# Patient Record
Sex: FEMALE | Race: WHITE | HISPANIC OR LATINO
[De-identification: names, ages, dates, MRNs, and addresses within clinical notes are randomized per-mention and may not be internally consistent; named-entity substitution may affect disease eponyms.]

---

## 2017-01-25 ENCOUNTER — HOSPITAL (OUTPATIENT)
Dept: OTHER | Age: 52
End: 2017-01-25
Attending: OBSTETRICS & GYNECOLOGY

## 2023-06-01 ENCOUNTER — OFFICE VISIT (OUTPATIENT)
Dept: URGENT CARE | Facility: CLINIC | Age: 58
End: 2023-06-01
Payer: MEDICARE

## 2023-06-01 VITALS
HEIGHT: 66 IN | WEIGHT: 180 LBS | HEART RATE: 85 BPM | SYSTOLIC BLOOD PRESSURE: 124 MMHG | TEMPERATURE: 98 F | BODY MASS INDEX: 28.93 KG/M2 | RESPIRATION RATE: 18 BRPM | DIASTOLIC BLOOD PRESSURE: 64 MMHG | OXYGEN SATURATION: 98 %

## 2023-06-01 DIAGNOSIS — R21 RASH: Primary | ICD-10-CM

## 2023-06-01 DIAGNOSIS — K04.7 DENTAL ABSCESS: ICD-10-CM

## 2023-06-01 PROCEDURE — 99203 OFFICE O/P NEW LOW 30 MIN: CPT | Mod: S$GLB,,,

## 2023-06-01 PROCEDURE — 99203 PR OFFICE/OUTPT VISIT, NEW, LEVL III, 30-44 MIN: ICD-10-PCS | Mod: S$GLB,,,

## 2023-06-01 RX ORDER — PREDNISONE 20 MG/1
40 TABLET ORAL
COMMUNITY
Start: 2023-05-06

## 2023-06-01 RX ORDER — HYDROXYZINE HYDROCHLORIDE 25 MG/1
25 TABLET, FILM COATED ORAL
COMMUNITY
Start: 2023-05-06

## 2023-06-01 RX ORDER — IVERMECTIN 3 MG/1
TABLET ORAL
COMMUNITY
Start: 2023-05-11

## 2023-06-01 RX ORDER — TRIAMCINOLONE ACETONIDE 1 MG/G
OINTMENT TOPICAL 2 TIMES DAILY
Qty: 30 G | Refills: 0 | Status: SHIPPED | OUTPATIENT
Start: 2023-06-01 | End: 2023-06-08

## 2023-06-01 RX ORDER — PENICILLIN V POTASSIUM 500 MG/1
500 TABLET, FILM COATED ORAL EVERY 12 HOURS
Qty: 20 TABLET | Refills: 0 | Status: SHIPPED | OUTPATIENT
Start: 2023-06-01 | End: 2023-06-11

## 2023-06-01 RX ORDER — PREDNISONE 20 MG/1
60 TABLET ORAL DAILY
Qty: 12 TABLET | Refills: 0 | Status: SHIPPED | OUTPATIENT
Start: 2023-06-01 | End: 2023-06-05

## 2023-06-01 NOTE — PROGRESS NOTES
"Subjective:      Patient ID: Sandra Landa is a 58 y.o. female.    Vitals:  height is 5' 6" (1.676 m) and weight is 81.6 kg (180 lb). Her oral temperature is 98.4 °F (36.9 °C). Her blood pressure is 124/64 and her pulse is 85. Her respiration is 18 and oxygen saturation is 98%.     Chief Complaint: Rash    Pt presents today w/ a diffuse rash on chest, Lt and Rt arm; onset approx over a month ago. Pt reports she went to the dermathology clinic in [place] 04/19/23 and diagnosed it as bite reaction and prescribed ivermectin; She also went to a hospital in [place] 2 weeks ago and received a steroid injection and oral steroids with no relief. pt went to [facility] 05/06/23 for the same symptoms and prescribed atarax and symptoms have worsen. Pt also c/o headache and Rt ear pain. Patient also complaining of dental abscess to the right side of her mouth. Pain with pressure and chewing. Noticed swelling to the area. Denies any recent antibiotic use before symptoms started. Denies change in medications. Denies change in diet. Denies change in laundry detergents, perfumes, lotions.     Rash  This is a new problem. The current episode started more than 1 month ago. The problem has been gradually worsening since onset. The affected locations include the chest, left arm and right arm. The rash is characterized by blistering, burning, draining, itchiness, pain, peeling, redness and dryness. She was exposed to nothing. Pertinent negatives include no anorexia, congestion, cough, diarrhea, eye pain, facial edema, fatigue, fever, joint pain, nail changes, rhinorrhea, shortness of breath or sore throat.     Constitution: Negative for fatigue and fever.   HENT:  Positive for ear pain and dental problem. Negative for facial swelling, facial trauma, congestion and sore throat.    Eyes:  Negative for eye pain.   Respiratory:  Negative for cough and shortness of breath.    Gastrointestinal:  Negative for diarrhea.   Skin:  " Positive for rash and erythema.    Objective:     Physical Exam   Constitutional: She is oriented to person, place, and time. She appears well-developed.      Comments:Patient sits comfortably in exam chair. Answers questions in complete sentences. Does not show any signs of distress or discoloration.        HENT:   Head: Normocephalic and atraumatic. Head is without abrasion, without contusion and without laceration.   Ears:   Right Ear: External ear normal.   Left Ear: External ear normal.   Nose: Nose normal.   Mouth/Throat: Oropharynx is clear and moist and mucous membranes are normal. Dental abscesses present.       Eyes: Conjunctivae, EOM and lids are normal. Pupils are equal, round, and reactive to light.   Neck: Trachea normal and phonation normal. Neck supple.   Cardiovascular: Normal rate, regular rhythm and normal heart sounds.   Pulmonary/Chest: Effort normal and breath sounds normal. No stridor. No respiratory distress.   Musculoskeletal: Normal range of motion.         General: Normal range of motion.   Neurological: She is alert and oriented to person, place, and time.   Skin: Skin is warm, dry, intact, rash, not purpuric, not vesicular and maculopapular. Capillary refill takes less than 2 seconds. erythema No abrasion, No burn, No bruising and No ecchymosis        Psychiatric: Her speech is normal and behavior is normal. Judgment and thought content normal.   Nursing note and vitals reviewed.      Assessment:     1. Rash    2. Dental abscess        Plan:       Rash  -     Ambulatory referral/consult to Dermatology  -     predniSONE (DELTASONE) 20 MG tablet; Take 3 tablets (60 mg total) by mouth once daily. for 4 days  Dispense: 12 tablet; Refill: 0  -     triamcinolone acetonide 0.1% (KENALOG) 0.1 % ointment; Apply topically 2 (two) times daily. for 7 days  Dispense: 30 g; Refill: 0    Dental abscess  -     penicillin v potassium (VEETID) 500 MG tablet; Take 1 tablet (500 mg total) by mouth every 12  (twelve) hours. for 10 days  Dispense: 20 tablet; Refill: 0                Patient Instructions   - Take steroid in the morning.   - You have been given an antibiotic to treat your condition today.    - Please complete the antibiotic as directed on the bottle.   - you can take over-the-counter probiotics during and after antibiotic use to help preserve gut adeel and reduce gastrointestinal symptoms    - You must understand that you have received an Urgent Care treatment only and that you may be released before all of your medical problems are known or treated.   - You, the patient, will arrange for follow up care as instructed.   - If your condition worsens or fails to improve we recommend that you receive another evaluation at the ER immediately or contact your PCP to discuss your concerns or return here.   - Follow up with your PCP or specialty clinic as directed in the next 1-2 weeks if not improved or as needed.  You can call [phone] to schedule an appointment with the appropriate provider.    If your symptoms do not improve or worsen, go to the emergency room immediately.

## 2023-06-01 NOTE — PATIENT INSTRUCTIONS
- Take steroid in the morning.   - You have been given an antibiotic to treat your condition today.    - Please complete the antibiotic as directed on the bottle.   - you can take over-the-counter probiotics during and after antibiotic use to help preserve gut adeel and reduce gastrointestinal symptoms    - You must understand that you have received an Urgent Care treatment only and that you may be released before all of your medical problems are known or treated.   - You, the patient, will arrange for follow up care as instructed.   - If your condition worsens or fails to improve we recommend that you receive another evaluation at the ER immediately or contact your PCP to discuss your concerns or return here.   - Follow up with your PCP or specialty clinic as directed in the next 1-2 weeks if not improved or as needed.  You can call [phone] to schedule an appointment with the appropriate provider.    If your symptoms do not improve or worsen, go to the emergency room immediately.